# Patient Record
Sex: MALE | Race: WHITE | NOT HISPANIC OR LATINO | URBAN - METROPOLITAN AREA
[De-identification: names, ages, dates, MRNs, and addresses within clinical notes are randomized per-mention and may not be internally consistent; named-entity substitution may affect disease eponyms.]

---

## 2017-01-04 DIAGNOSIS — J01.00 ACUTE MAXILLARY SINUSITIS: ICD-10-CM

## 2017-01-04 DIAGNOSIS — R06.00 DYSPNEA: ICD-10-CM

## 2017-01-04 DIAGNOSIS — R60.9 EDEMA: ICD-10-CM

## 2017-01-04 DIAGNOSIS — J20.9 ACUTE BRONCHITIS: ICD-10-CM

## 2017-01-06 ENCOUNTER — GENERIC CONVERSION - ENCOUNTER (OUTPATIENT)
Dept: OTHER | Facility: OTHER | Age: 82
End: 2017-01-06

## 2017-01-13 ENCOUNTER — GENERIC CONVERSION - ENCOUNTER (OUTPATIENT)
Dept: OTHER | Facility: OTHER | Age: 82
End: 2017-01-13

## 2017-01-20 ENCOUNTER — ALLSCRIPTS OFFICE VISIT (OUTPATIENT)
Dept: OTHER | Facility: OTHER | Age: 82
End: 2017-01-20

## 2017-01-20 ENCOUNTER — GENERIC CONVERSION - ENCOUNTER (OUTPATIENT)
Dept: OTHER | Facility: OTHER | Age: 82
End: 2017-01-20

## 2017-01-27 ENCOUNTER — GENERIC CONVERSION - ENCOUNTER (OUTPATIENT)
Dept: OTHER | Facility: OTHER | Age: 82
End: 2017-01-27

## 2017-02-02 ENCOUNTER — GENERIC CONVERSION - ENCOUNTER (OUTPATIENT)
Dept: OTHER | Facility: OTHER | Age: 82
End: 2017-02-02

## 2017-02-24 ENCOUNTER — GENERIC CONVERSION - ENCOUNTER (OUTPATIENT)
Dept: OTHER | Facility: OTHER | Age: 82
End: 2017-02-24

## 2017-02-28 ENCOUNTER — GENERIC CONVERSION - ENCOUNTER (OUTPATIENT)
Dept: OTHER | Facility: OTHER | Age: 82
End: 2017-02-28

## 2017-03-01 ENCOUNTER — ALLSCRIPTS OFFICE VISIT (OUTPATIENT)
Dept: OTHER | Facility: OTHER | Age: 82
End: 2017-03-01

## 2017-03-03 ENCOUNTER — GENERIC CONVERSION - ENCOUNTER (OUTPATIENT)
Dept: OTHER | Facility: OTHER | Age: 82
End: 2017-03-03

## 2017-03-10 ENCOUNTER — GENERIC CONVERSION - ENCOUNTER (OUTPATIENT)
Dept: OTHER | Facility: OTHER | Age: 82
End: 2017-03-10

## 2017-03-22 ENCOUNTER — GENERIC CONVERSION - ENCOUNTER (OUTPATIENT)
Dept: OTHER | Facility: OTHER | Age: 82
End: 2017-03-22

## 2017-03-22 LAB
25(OH)D3 SERPL-MCNC: NORMAL NG/ML
VITAMIN D, 25 OH, D2 (HISTORICAL): NORMAL
VITAMIN D, 25 OH, D3 (HISTORICAL): NORMAL

## 2017-03-24 ENCOUNTER — GENERIC CONVERSION - ENCOUNTER (OUTPATIENT)
Dept: OTHER | Facility: OTHER | Age: 82
End: 2017-03-24

## 2017-03-24 ENCOUNTER — ALLSCRIPTS OFFICE VISIT (OUTPATIENT)
Dept: OTHER | Facility: OTHER | Age: 82
End: 2017-03-24

## 2017-03-24 DIAGNOSIS — M25.511 PAIN IN RIGHT SHOULDER: ICD-10-CM

## 2017-03-24 DIAGNOSIS — N18.4 CHRONIC KIDNEY DISEASE, STAGE IV (SEVERE) (HCC): ICD-10-CM

## 2017-03-24 DIAGNOSIS — E03.9 HYPOTHYROIDISM: ICD-10-CM

## 2017-03-24 DIAGNOSIS — I50.9 HEART FAILURE (HCC): ICD-10-CM

## 2017-03-31 ENCOUNTER — GENERIC CONVERSION - ENCOUNTER (OUTPATIENT)
Dept: OTHER | Facility: OTHER | Age: 82
End: 2017-03-31

## 2017-04-07 ENCOUNTER — GENERIC CONVERSION - ENCOUNTER (OUTPATIENT)
Dept: OTHER | Facility: OTHER | Age: 82
End: 2017-04-07

## 2017-04-14 ENCOUNTER — GENERIC CONVERSION - ENCOUNTER (OUTPATIENT)
Dept: OTHER | Facility: OTHER | Age: 82
End: 2017-04-14

## 2017-04-17 ENCOUNTER — GENERIC CONVERSION - ENCOUNTER (OUTPATIENT)
Dept: OTHER | Facility: OTHER | Age: 82
End: 2017-04-17

## 2017-04-21 ENCOUNTER — GENERIC CONVERSION - ENCOUNTER (OUTPATIENT)
Dept: OTHER | Facility: OTHER | Age: 82
End: 2017-04-21

## 2017-04-21 ENCOUNTER — ALLSCRIPTS OFFICE VISIT (OUTPATIENT)
Dept: OTHER | Facility: OTHER | Age: 82
End: 2017-04-21

## 2017-04-28 ENCOUNTER — GENERIC CONVERSION - ENCOUNTER (OUTPATIENT)
Dept: OTHER | Facility: OTHER | Age: 82
End: 2017-04-28

## 2017-05-05 ENCOUNTER — GENERIC CONVERSION - ENCOUNTER (OUTPATIENT)
Dept: OTHER | Facility: OTHER | Age: 82
End: 2017-05-05

## 2017-05-12 ENCOUNTER — GENERIC CONVERSION - ENCOUNTER (OUTPATIENT)
Dept: OTHER | Facility: OTHER | Age: 82
End: 2017-05-12

## 2017-05-15 ENCOUNTER — GENERIC CONVERSION - ENCOUNTER (OUTPATIENT)
Dept: OTHER | Facility: OTHER | Age: 82
End: 2017-05-15

## 2017-05-17 ENCOUNTER — GENERIC CONVERSION - ENCOUNTER (OUTPATIENT)
Dept: OTHER | Facility: OTHER | Age: 82
End: 2017-05-17

## 2017-05-19 ENCOUNTER — GENERIC CONVERSION - ENCOUNTER (OUTPATIENT)
Dept: OTHER | Facility: OTHER | Age: 82
End: 2017-05-19

## 2017-06-26 ENCOUNTER — GENERIC CONVERSION - ENCOUNTER (OUTPATIENT)
Dept: OTHER | Facility: OTHER | Age: 82
End: 2017-06-26

## 2017-06-30 ENCOUNTER — GENERIC CONVERSION - ENCOUNTER (OUTPATIENT)
Dept: OTHER | Facility: OTHER | Age: 82
End: 2017-06-30

## 2017-07-03 ENCOUNTER — ALLSCRIPTS OFFICE VISIT (OUTPATIENT)
Dept: OTHER | Facility: OTHER | Age: 82
End: 2017-07-03

## 2018-01-09 NOTE — MISCELLANEOUS
Chief Complaint  Chief Complaint Free Text Note Form: unable to do TO because not done by MA or office staff properly      History of Present Illness  TCM Communication [facility]: The date of admission:, date of discharge: 06/25/2017  He was discharged to home  Symptoms: cough, but no fever, no dizziness, no headache, no fatigue, no shortness of breath, no chest pain, no back pain on left side, no back pain on right side, no arm pain left side, no arm pain on right side, no leg pain on left side, no leg pain on right side, no upper abdominal pain, no middle abdominal pain, no lower abdominal pain, no rash:, no anorexia, no nausea, no vomiting, no loose stools, no constipation, no pain with urinating, no incisional pain, no wound drainage and no swelling  Communication performed and completed by      Active Problems    1  Acute exacerbation of CHF (congestive heart failure) (428 0) (I50 9)   2  A-fib (427 31) (I48 91)   3  Ambulatory dysfunction (719 7) (R26 2)   4  Anemia of renal disease (285 21) (D63 1)   5  Back pain (724 5) (M54 9)   6  CHF (congestive heart failure) (428 0) (I50 9)   7  Chronic kidney disease, stage 4 (severe) (585 4) (N18 4)   8  Chronic obstructive pulmonary disease (496) (J44 9)   9  Cognitive decline (294 9) (R41 89)   10  Depression (311) (F32 9)   11  Dermatitis fungal (111 9) (B36 9)   12  Dermatitis, eczematoid (692 9) (L30 9)   13  Dyspnea (786 09) (R06 00)   14  Edema (782 3) (R60 9)   15  Flu vaccine need (V04 81) (Z23)   16  Gout (274 9) (M10 9)   17  Hyperlipidemia (272 4) (E78 5)   18  Hypertension (401 9) (I10)   19  Hypothyroidism (244 9) (E03 9)   20  Inguinal hernia, left (550 90) (K40 90)   21  Knee pain (719 46) (M25 569)   22  Sacral ulcer (707 8) (L98 429)   23  Shoulder pain, right (719 41) (M25 511)   24  Vitamin D deficiency (268 9) (E55 9)    Surgical History    1  History of Heart Surgery   2  History of Pacemaker Placement   3   History of Pulmonary Valve Repair 4  History of Tonsillectomy   5  History of Transurethral Resection Of Prostate (TURP)    Family History  Mother    1  Family history of Primary malignant neoplasm of colorectal region (154 0) (C19)  Father    2  Family history of Cerebrovascular disease (437 9) (I66 5)    Social History    · Advance directive information unavailable   · Caffeine use (V49 89) (F15 90)   · Denied: History of domestic violence (V15 41)   · Housing Details: House   · Lack of exercise (V69 0) (Z72 3)   ·    · Never a smoker   · Rarely consumes alcohol (V49 89) (Z78 9)   · Retired   · [employer]   · Two children    Current Meds   1  Allopurinol 100 MG Oral Tablet; Take 1 tablet by mouth two  times daily; Therapy: 26LEC6645 to (Evaluate:39Gfl9965)  Requested for: 91Pcq1611; Last   Rx:21Weh9667 Ordered   2  Ammonium Lactate 12 % External Cream; APPLY  AND RUB  IN A THIN FILM TO   AFFECTED AREAS TWICE DAILY  (AM AND PM); Therapy: 07OLG5713 to (Evaluate:04Jun2017)  Requested for: 47ZNH5311; Last   Rx:45Tva6757 Ordered   3  Atorvastatin Calcium 10 MG Oral Tablet; TAKE 1 TABLET DAILY; Therapy: 28HHF5851 to (Evaluate:41Kxi1643)  Requested for: 87YYU8648; Last   Rx:60Uqk5775 Ordered   4  Budesonide 0 5 MG/2ML Inhalation Suspension; USE 1 UNIT DOSE VIA NEBULIZER   DAILY; Therapy: 46MAB2611 to (Evaluate:56Sil9365)  Requested for: 84EHA3423; Last   Rx:10Ewe4782 Ordered   5  Carvedilol 25 MG Oral Tablet; Take 1 tablet by mouth two  times daily; Therapy: 65QTA7430 to (Gloria Escamilla)  Requested for: 35OJA0980; Last   Rx:61Znn6180 Ordered   6  Citalopram Hydrobromide 10 MG Oral Tablet; take 1 tablet by mouth daily; Therapy: 24ABJ2514 to (Evaluate:12Nov2017)  Requested for: 41ALF3417; Last   Rx:34Jza7357 Ordered   7  Digox 125 MCG Oral Tablet; take 1 tablet by mouth every day; Therapy: 47FLK6504 to (Evaluate:07Apr2016); Last Rx:08Mar2016 Ordered   8   Doxercalciferol 0 5 MCG Oral Capsule; TAKE 1 CAPSULE Daily  Requested for:   02Hjr6233; Last Rx:84Zeu9782 Ordered   9  Flector 1 3 % Transdermal Patch; APPLY PATCH TO AFFECTED AREA ONCE DAILY; Therapy: 62IZC3208 to (Evaluate:19Jun2016); Last FRANDY:86QZK2086 Ordered   10  Furosemide 40 MG Oral Tablet; Take 2 tablet by mouth  twice a day; Therapy: 57HVC7642 to (Evaluate:29Nov2017)  Requested for: 10TGP4963; Last    Rx:02Jun2017 Ordered   11  Ipratropium-Albuterol 0 5-2 5 (3) MG/3ML Inhalation Solution; 1 unit dose qid prn; Therapy: 57XBP8617 to (Evaluate:09Fcx5807)  Requested for: 09KDK5123; Last    Rx:84Vny0908 Ordered   12  Levothyroxine Sodium 100 MCG Oral Tablet; TAKE 1 TABLET DAILY AS DIRECTED; Therapy: 44EER6133 to (Evaluate:96Jha5242)  Requested for: 52SYR2621; Last    Rx:17May2017 Ordered   13  Lisinopril 2 5 MG Oral Tablet; take 1 tablet by mouth daily; Therapy: 32PVK7843 to (Evaluate:13May2017)  Requested for: 45CRX0269; Last    Rx:18May2016 Ordered   14  Mupirocin Calcium 2 % External Cream; APPLY AND GENTLY MASSAGE INTO    AFFECTED AREA(S) TWICE DAILY; Therapy: 79MDP3219 to (Evaluate:18Jan2016); Last YP:20XZE2988 Ordered   15  Mupirocin Calcium 2 % External Cream; APPLY AND GENTLY MASSAGE INTO    AFFECTED AREA(S) TWICE DAILY; Therapy: 29Apr2015 to (Brian Cortes)  Requested for: 29Apr2015; Last    Rx:29Apr2015 Ordered   16  Promethazine-Codeine 6 25-10 MG/5ML Oral Syrup; take 1 teaspoonful every 6 hours as    needed; Therapy: 64URV6635 to (Evaluate:90Xam4851)  Requested for: 04Ith4554; Last    Rx:05Aug2016 Ordered   17  Santyl 250 UNIT/GM External Ointment; APPLY TO AFFECTED AREA(S) ONCE DAILY AS    DIRECTED; Therapy: 73VOW7701 to (Evaluate:16Apr2016)  Requested for: 35XGS3368; Last    Rx:17Mar2016 Ordered   18  TraMADol HCl - 50 MG Oral Tablet; TAKE 1/2-1 TABLET BY MOUTH 3 TIMES A DAY AS    NEEDED FOR PAIN;    Therapy: 84WFN0187 to (Evaluate:28Fct2877)  Requested for: 70YAB7746; Last    Rx:11Mar2017 Ordered   19   Vitamin D (Ergocalciferol) 38826 UNIT Oral Capsule; TAKE 1 CAPSULE BY MOUTH    TWICE A WEEK; Therapy: 93CBJ3690 to (Evaluate:34Eks6036)  Requested for: 73HXK8927; Last    Rx:30Mar2017 Ordered   20  Warfarin Sodium 5 MG Oral Tablet; take 1 tablet by mouth  daily as directed; Therapy: 24Gwh2911 to (Evaluate:25Coq3933)  Requested for: 97Mrg8858; Last    Rx:22Hpg0143 Ordered    Allergies    1  morphine    Signatures   Electronically signed by :  Casey Goodman MD; Jul  3 2017  2:15PM EST                       (Author)

## 2018-01-09 NOTE — PROGRESS NOTES
Assessment    1  Hematoma of lower extremity, right, sequela (906 3) (S80 11XS)   2  Herpes zoster without complication (926 0) (U35 9)    Plan  Herpes zoster without complication    · ValACYclovir HCl - 500 MG Oral Tablet (Valtrex); 1 po tid    Chief Complaint  Chief Complaint Free Text Note Form: f/u after hematoma started leaking, possible shingles      History of Present Illness  Skin Wound:   Dreic Snider presents with complaints of mild right leg skin wound, described as dull  The symptoms resulted from a fall  Symptoms are improving  Previous Evaluation: hematoma "burst" on its own Monday  Herpes Zoster (Brief): The patient is being seen for an initial evaluation of herpes zoster  Symptoms:  dermatomal pain, skin tingling, localized itching and skin redness, but no rash and no vesicular lesion(s)  Associated symptoms:  just doesn' t feel well, but no fever and no chills  The patient is not currently being treated for this problem  The patient has had one previous episode of herpes zoster  Pertinent medical history:  chicken pox  Review of Systems  Complete-Male:   Constitutional: feeling poorly, but no fever, no recent weight gain, no chills and no recent weight loss  Cardiovascular: no chest pain  Respiratory: no cough  Integumentary: a rash, itching and skin wound  Neurological: no headache  Active Problems    1  Acute bronchitis (466 0) (J20 9)   2  A-fib (427 31) (I48 91)   3  Cataract (366 9) (H26 9)   4  Cellulitis (682 9) (L03 90)   5  CHF (congestive heart failure) (428 0) (I50 9)   6  Chronic obstructive pulmonary disease (496) (J44 9)   7  CKD (chronic kidney disease), stage III (585 3) (N18 3)   8  Degenerative disc disease (722 6)   9  Depression (311) (F32 9)   10  Diverticulitis of large intestine without perforation or abscess without bleeding (562 11)    (K57 32)   11  Dyspnea (786 09) (R06 00)   12  Edema (782 3) (R60 9)   13  Flu vaccine need (V04 81) (Z23)   14  Gout (274 9) (M10 9)   15  Hematoma of lower extremity, right, sequela (906 3) (S80 11XS)   16  Hyperlipidemia (272 4) (E78 5)   17  Hypertension (401 9) (I10)   18  Hypothyroidism (244 9) (E03 9)   19  Nausea (787 02) (R11 0)   20  Otitis media (382 9) (H66 90)    Surgical History    1  History of Heart Surgery   2  History of Pacemaker Placement   3  History of Pulmonary Valve Repair   4  History of Tonsillectomy   5  History of Transurethral Resection Of Prostate (TURP)  Surgical History Reviewed: The surgical history was reviewed and updated today  Family History    1  Family history of Primary malignant neoplasm of colorectal region (154 0) (C19)    2  Family history of Cerebrovascular disease (437 9) (I66 5)  Family History Reviewed: The family history was reviewed and updated today  Social History    · Caffeine use (V49 89) (F15 90)   · Denied: History of domestic violence (V15 41)   · Lack of exercise (V69 0) (Z72 3)   ·    · Never a smoker   · Rarely consumes alcohol (V49 89) (Z78 9)   · Two children  Social History Reviewed: The social history was reviewed and updated today  Current Meds   1  Allopurinol 100 MG Oral Tablet; Take 1 tablet by mouth two  times daily; Therapy: 63NFR5580 to (Evaluate:19Ppx5793)  Requested for: 63XOR7394; Last   Rx:12Jan2015 Ordered   2  Atorvastatin Calcium 10 MG Oral Tablet (Lipitor); TAKE 1 TABLET DAILY; Therapy: 46AJY5080 to (Evaluate:56Cfq5467)  Requested for: 14DFG0249; Last   Rx:02Jul2015 Ordered   3  Budesonide 0 5 MG/2ML Inhalation Suspension; USE 1 UNIT DOSE VIA NEBULIZER   DAILY; Therapy: 44XQL4534 to (Evaluate:36Pjt3177)  Requested for: 59SFG2606; Last   Rx:90Kaa7910 Ordered   4  Carvedilol 25 MG Oral Tablet; 1po bid; Therapy: 35MCL5267 to (Evaluate:49Xpp4055)  Requested for: 93HLG6765; Last   Rx:15Oct2015 Ordered   5   Cephalexin 500 MG Oral Capsule; TAKE 1 CAPSULE 3 TIMES DAILY UNTIL GONE;   Therapy: 29NBP8043 to (Evaluate:18Jan2016); Last GT:01WZD9757 Ordered   6  Citalopram Hydrobromide 10 MG Oral Tablet (CeleXA); take 1 tablet by mouth daily; Therapy: 56LJH0584 to (Evaluate:38Uhe0610)  Requested for: 39KCZ2428; Last   Rx:06Jan2016 Ordered   7  Diltiazem HCl - 120 MG Oral Tablet; Take 1 tablet twice daily; Therapy: 58UHP0250 to (Lamont Borgesather)  Requested for: 37PIN2657; Last   Rx:26Jun2015 Ordered   8  Doxercalciferol 0 5 MCG Oral Capsule (Hectorol); TAKE 1 CAPSULE Daily  Requested   for: 26Tzc4290; Last Rx:96Vsg7077 Ordered   9  Doxycycline Hyclate 100 MG Oral Capsule; TAKE 1 CAPSULE TWICE DAILY UNTIL   GONE;   Therapy: 09LZP4313 to (Evaluate:26Dec2015)  Requested for: 70CDR9533; Last   Rx:33Bep3335 Ordered   10  Doxycycline Hyclate 100 MG Oral Capsule; TAKE 1 CAPSULE TWICE DAILY UNTIL    GONE;    Therapy: 18OQD8248 to (Evaluate:07Jan2016)  Requested for: 05Wch4028; Last    Rx:28Dec2015 Ordered   11  Furosemide 40 MG Oral Tablet; TAKE 1 TABLET TWICE DAILY; Therapy: 42ZCX8743 to (Evaluate:20Jun2016)  Requested for: 75BDB5846; Last    Rx:26Jun2015 Ordered   12  Ipratropium-Albuterol 0 5-2 5 (3) MG/3ML Inhalation Solution (DuoNeb); 1 unit dose qid    prn; Therapy: 12HYX2082 to (Evaluate:46Jxu0079)  Requested for: 45GRN3364; Last    Rx:24Gkw5497 Ordered   13  Levofloxacin 500 MG Oral Tablet (Levaquin); TAKE 1 TABLET DAILY; Therapy: 56YOF2863 to (Evaluate:81Cgr7959)  Requested for: 11XVA9961; Last    Rx:01Dec2015 Ordered   14  Levothyroxine Sodium 88 MCG Oral Tablet; Take 1 tablet daily + extra 1/2 tablelet (44    mcg) once weekly; Therapy: 68HYF2663 to (Evaluate:28Oct2016)  Requested for: 73ETU0756; Last    Rx:03Nov2015 Ordered   15  Lisinopril 2 5 MG Oral Tablet; take 1 tablet by mouth daily; Therapy: 94NWR1706 to (Evaluate:43Qnb6417)  Requested for: 76NWH5773; Last    Rx:00Dfh1388 Ordered   16  MetroNIDAZOLE 500 MG Oral Tablet; Take 1 three times daily for 10 days;     Therapy: 54ZPR0558 to (Evaluate:20Sep2015); Last Rx:83Ked9103 Ordered   17  Mupirocin Calcium 2 % External Cream (Bactroban); APPLY AND GENTLY MASSAGE    INTO AFFECTED AREA(S) TWICE DAILY; Therapy: 36UVG1168 to (Evaluate:18Jan2016); Last FB:80QBZ9120 Ordered   18  Mupirocin Calcium 2 % External Cream (Bactroban); APPLY AND GENTLY MASSAGE    INTO AFFECTED AREA(S) TWICE DAILY; Therapy: 66Hpi7099 to (Jovanny Ennis)  Requested for: 29Apr2015; Last    Rx:22Ftl0064 Ordered   19  Ondansetron HCl - 4 MG Oral Tablet; 1 PO BID PRN NAUSEA; Therapy: 64WGZ0305 to (Mir Aldan)  Requested for: 77YHJ8058; Last    Rx:18Fcx6746 Ordered   20  PredniSONE 10 MG Oral Tablet; 4/d for  3days, 3/d for 3 days, 2/d for 3 days, 1/d for 3    days; Therapy: 88BAU4788 to (Evaluate:09Jan2016)  Requested for: 0676 543 19 15; Last    Rx:21Sjb8074 Ordered   21  PredniSONE 20 MG Oral Tablet; 1 PO BID; Therapy: 99NPP0875 to (Evaluate:39Aap6978)  Requested for: 92Fns8766; Last    Rx:64Jqc6385 Ordered   22  Promethazine-Codeine 6 25-10 MG/5ML Oral Syrup; take 1 teaspoonful every 6 hours as    needed; Therapy: 88Vfu6454 to (Evaluate:45Mvp2186)  Requested for: 04GIP9863; Last    Rx:44Yyt5238 Ordered   23  TraMADol HCl - 50 MG Oral Tablet; 1/2-1 po tid prn pain; Therapy: 55MRI7969 to (Evaluate:03Mar2015); Last Rx:02Jan2015 Ordered   24  Warfarin Sodium 5 MG Oral Tablet; take 1 tablet by mouth  daily as directed; Therapy: 47Faj6995 to (Evaluate:55Qsd6006)  Requested for: 38Dma5923; Last    Rx:81Szw1158 Ordered  Medication List Reviewed: The medication list was reviewed and updated today  Allergies    1  morphine    Vitals  Signs [Data Includes: Current Encounter]   Recorded: 77XDV5770 12:42PM   Heart Rate: 74  Systolic: 926  Diastolic: 77  Height Unobtainable: Yes  Weight Unobtainable: Yes    Physical Exam    Constitutional   General appearance: Abnormal   chronically ill, uncomfortable, overweight and appears tired     Pulmonary Auscultation of lungs: Clear to auscultation, equal breath sounds bilaterally, no wheezes, no rales, no rhonci  Cardiovascular   Auscultation of heart: Abnormal   The rhythm was irregularly irregular  Examination of extremities for edema and/or varicosities: Abnormal   right ankle 2+ pitting edema, left ankle 1+ pitting edema, right pretibial 2+ pitting edema and left pretibial 1+ pretibial pitting edema  Skin   Examination of the skin for lesions: Abnormal   Multiple, red vesicle(s) in a L T 10 dermatomal distribution  A 3 cm ecchymosis was noted  on the right shin  Discussion/Summary  Discussion Summary:   Will contact [facility] regarding wound, see how pt does on meds  Signatures   Electronically signed by :  Hudson Jonas MD; Jan 28 2016 12:50PM EST                       (Author)

## 2018-01-10 NOTE — RESULT NOTES
Verified Results  Coumadin Flow Sheet [id number] 01:42PM Gretchen Gomes     Test Name Result Flag Reference   INR 1 2     Current Dose ALT 2 5/5MG QD

## 2018-01-10 NOTE — RESULT NOTES
Verified Results  Coumadin Flow Sheet [id number] 09:57AM Doug Esqueda     Test Name Result Flag Reference   INR 2 9     Current Dose 6MG QD

## 2018-01-10 NOTE — RESULT NOTES
Verified Results  Coumadin Flow Sheet [id number] 08:04AM Catherine Garcia     Test Name Result Flag Reference   INR 1 8

## 2018-01-10 NOTE — RESULT NOTES
Verified Results  Coumadin Flow Sheet [id number] 08:28AM Myers Hunger     Test Name Result Flag Reference   INR 1 6     Current Dose      5mg qd after holding for 3

## 2018-01-10 NOTE — RESULT NOTES
Verified Results  Coumadin Flow Sheet [id number] 08:47AM Any Cones     Test Name Result Flag Reference   INR 1 4     Current Dose ALT 2 5 & 5 MG

## 2018-01-10 NOTE — RESULT NOTES
Verified Results  Coumadin Flow Sheet [id number] 09:20AM Doug Esqueda     Test Name Result Flag Reference   INR 2 7     Current Dose ALT 2 5/5MG QD

## 2018-01-11 NOTE — RESULT NOTES
Verified Results  Coumadin Flow Sheet [id number] 09:20AM Debrah Ahumada     Test Name Result Flag Reference   INR 1 6

## 2018-01-11 NOTE — RESULT NOTES
Verified Results  Coumadin Flow Sheet [id number] 08:37AM Hettie December     Test Name Result Flag Reference   INR 1 5     Current Dose ALT 7 5MG/5MG

## 2018-01-11 NOTE — RESULT NOTES
Verified Results  Coumadin Flow Sheet [id number] 09:14AM Evert White     Test Name Result Flag Reference   INR 1 8

## 2018-01-11 NOTE — RESULT NOTES
Verified Results  Coumadin Flow Sheet [id number] 08:30AM Gillian Fenton     Test Name Result Flag Reference   INR 1 3     Current Dose ALT 2 5/5MGQD

## 2018-01-11 NOTE — RESULT NOTES
Verified Results  Coumadin Flow Sheet [id number] 08:48AM Leslie Kulkarni     Test Name Result Flag Reference   INR 1 6     Current Dose 5mg qd

## 2018-01-11 NOTE — RESULT NOTES
Verified Results  Coumadin Flow Sheet [id number] 08:30AM Aviva Presser     Test Name Result Flag Reference   INR 1 4

## 2018-01-11 NOTE — RESULT NOTES
Verified Results  Coumadin Flow Sheet [id number] 08:30AM Raul Medley     Test Name Result Flag Reference   INR 3 6     Current Dose alt 2 5/5mg

## 2018-01-11 NOTE — RESULT NOTES
Verified Results  Coumadin Flow Sheet [id number] 10:59AM Xiang Chau     Test Name Result Flag Reference   INR 3 3     Current Dose alt 2 5/5mg

## 2018-01-12 NOTE — RESULT NOTES
Verified Results  Coumadin Flow Sheet [id number] 11:15AM Alessandro De Leon     Test Name Result Flag Reference   INR 1 3

## 2018-01-12 NOTE — RESULT NOTES
Verified Results  Coumadin Flow Sheet [id number] 08:36AM Leslie Kulkarni     Test Name Result Flag Reference   INR 4 7     Current Dose      PER PT WAS TOLD BY YOU TO TAKE WHOLE TAB QD

## 2018-01-12 NOTE — RESULT NOTES
Verified Results  Coumadin Flow Sheet [id number] 08:10AM Princess Schilling     Test Name Result Flag Reference   INR 2 5     Current Dose ALT 7 5/5MG

## 2018-01-12 NOTE — RESULT NOTES
Verified Results  Coumadin Flow Sheet [id number] 11:20AM Christ Bey     Test Name Result Flag Reference   INR 1 9

## 2018-01-12 NOTE — RESULT NOTES
Verified Results  Coumadin Flow Sheet [id number] 08:48AM João Zuniga     Test Name Result Flag Reference   INR 1 2     Current Dose alt 5mg qd/7 5mg qd

## 2018-01-12 NOTE — RESULT NOTES
Verified Results  Coumadin Flow Sheet [id number] 11:18AM Walton Bolaños     Test Name Result Flag Reference   INR 2 7     Current Dose 7 5mg alt 5mg

## 2018-01-12 NOTE — RESULT NOTES
Verified Results  Coumadin Flow Sheet [id number] 08:45AM [facility]     Test Name Result Flag Reference   INR 1 4

## 2018-01-12 NOTE — RESULT NOTES
Verified Results  Coumadin Flow Sheet [id number] 08:29AM Catalina Gotti     Test Name Result Flag Reference   INR 2 5     Current Dose 6MG QD

## 2018-01-12 NOTE — RESULT NOTES
Verified Results  Coumadin Flow Sheet [id number] 09:22AM Francia Moran     Test Name Result Flag Reference   INR 1 3     Current Dose ALT 5MG /7 5 MG QD

## 2018-01-12 NOTE — RESULT NOTES
Verified Results  Coumadin Flow Sheet [id number] 08:08AM Geraldene Ego     Test Name Result Flag Reference   INR 1 4     Current Dose 5MG QD

## 2018-01-13 VITALS — DIASTOLIC BLOOD PRESSURE: 72 MMHG | SYSTOLIC BLOOD PRESSURE: 110 MMHG | HEART RATE: 76 BPM

## 2018-01-13 VITALS
HEIGHT: 61 IN | BODY MASS INDEX: 30.78 KG/M2 | HEART RATE: 94 BPM | WEIGHT: 163 LBS | DIASTOLIC BLOOD PRESSURE: 74 MMHG | SYSTOLIC BLOOD PRESSURE: 110 MMHG

## 2018-01-13 VITALS
SYSTOLIC BLOOD PRESSURE: 110 MMHG | HEART RATE: 80 BPM | WEIGHT: 163 LBS | DIASTOLIC BLOOD PRESSURE: 70 MMHG | BODY MASS INDEX: 30.78 KG/M2 | HEIGHT: 61 IN

## 2018-01-13 VITALS — DIASTOLIC BLOOD PRESSURE: 70 MMHG | HEART RATE: 80 BPM | SYSTOLIC BLOOD PRESSURE: 110 MMHG

## 2018-01-13 VITALS
HEIGHT: 61 IN | WEIGHT: 165 LBS | OXYGEN SATURATION: 96 % | SYSTOLIC BLOOD PRESSURE: 110 MMHG | BODY MASS INDEX: 31.15 KG/M2 | HEART RATE: 72 BPM | DIASTOLIC BLOOD PRESSURE: 80 MMHG

## 2018-01-13 NOTE — RESULT NOTES
Verified Results  Coumadin Flow Sheet [id number] 08:27AM Roxianne Floor     Test Name Result Flag Reference   INR 1 4     Current Dose ALT 5MG/2 5MG

## 2018-01-13 NOTE — RESULT NOTES
Verified Results  Coumadin Flow Sheet [id number] 08:18AM Heron Coates     Test Name Result Flag Reference   INR 1 4     Current Dose TAKE 7 5mg/5mg

## 2018-01-13 NOTE — RESULT NOTES
Verified Results  Coumadin Flow Sheet [id number] 08:13AM Mark Erazo     Test Name Result Flag Reference   INR 2 4

## 2018-01-13 NOTE — RESULT NOTES
Verified Results  Coumadin Flow Sheet [id number] 08:37AM Eliot Ball     Test Name Result Flag Reference   INR 2 4     Current Dose ALT 2 5 & 5 MG

## 2018-01-13 NOTE — RESULT NOTES
Verified Results  Coumadin Flow Sheet [id number] 09:51AM Catalina Gotti     Test Name Result Flag Reference   INR 2 6     Current Dose ALT 7 5/5MG

## 2018-01-13 NOTE — RESULT NOTES
Verified Results  Coumadin Flow Sheet [id number] 02:17PM Rory Mckay     Test Name Result Flag Reference   INR 3 3     Current Dose alt 7 5mg/5mg

## 2018-01-14 NOTE — RESULT NOTES
Verified Results  Coumadin Flow Sheet [id number] 08:57AM Sarpy Bolaños     Test Name Result Flag Reference   INR 2 6     Current Dose      ALT WHOLE TAB AND 1/2 TAB

## 2018-01-14 NOTE — RESULT NOTES
Verified Results  Coumadin Flow Sheet [id number] 08:52AM Gillian Fenton     Test Name Result Flag Reference   INR 1 3     Current Dose ALT 7 5MG AND 5MG

## 2018-01-14 NOTE — RESULT NOTES
Verified Results  Coumadin Flow Sheet [id number] 08:45AM Mark Erazo     Test Name Result Flag Reference   INR 2 1     Current Dose alt 2 5/5mg qd

## 2018-01-14 NOTE — RESULT NOTES
Verified Results  Coumadin Flow Sheet [id number] 10:23AM Hettie December     Test Name Result Flag Reference   INR 1 3     Current Dose 5MG/ 7 5 MG QD

## 2018-01-14 NOTE — PROGRESS NOTES
Assessment    1  Hematoma of lower extremity, right, sequela (906 3) (S80 11XS)    Plan  Hematoma of lower extremity, right, sequela    · Follow-up visit in 3 weeks Evaluation and Treatment  Follow-up  Status: Hold For -  Scheduling  Requested for: 21MSD5902    Chief Complaint  Chief Complaint Free Text Note Form: worried hematoma looks worse-off coumadin due to subdural, still coughing up mucus      History of Present Illness  Skin Wound:   Ana Foley presents with complaints of moderately severe right leg skin wound, described as dull  The symptoms resulted from a fall  The injury occurred at home  Symptoms are unchanged  Previous Evaluation: In hospital for subdural and testing of leg wound was negative, no ortho eval       Review of Systems  Complete-Male:   Constitutional: feeling poorly  Cardiovascular: lower extremity edema and feels edema is worse  Respiratory: cough  Integumentary: skin wound  Neurological: headache  Active Problems    1  Acute bronchitis (466 0) (J20 9)   2  A-fib (427 31) (I48 91)   3  Cataract (366 9) (H26 9)   4  Cellulitis (682 9) (L03 90)   5  CHF (congestive heart failure) (428 0) (I50 9)   6  Chronic obstructive pulmonary disease (496) (J44 9)   7  CKD (chronic kidney disease), stage III (585 3) (N18 3)   8  Degenerative disc disease (722 6)   9  Depression (311) (F32 9)   10  Diverticulitis of large intestine without perforation or abscess without bleeding (562 11)    (K57 32)   11  Dyspnea (786 09) (R06 00)   12  Edema (782 3) (R60 9)   13  Flu vaccine need (V04 81) (Z23)   14  Gout (274 9) (M10 9)   15  Hematoma of lower extremity, right, sequela (906 3) (S80 11XS)   16  Hyperlipidemia (272 4) (E78 5)   17  Hypertension (401 9) (I10)   18  Hypothyroidism (244 9) (E03 9)   19  Nausea (787 02) (R11 0)   20  Otitis media (382 9) (H66 90)    Surgical History    1  History of Heart Surgery   2  History of Pacemaker Placement   3  History of Pulmonary Valve Repair   4  History of Tonsillectomy   5  History of Transurethral Resection Of Prostate (TURP)  Surgical History Reviewed: The surgical history was reviewed and updated today  Family History    1  Family history of Primary malignant neoplasm of colorectal region (154 0) (C19)    2  Family history of Cerebrovascular disease (437 9) (I66 5)  Family History Reviewed: The family history was reviewed and updated today  Social History    · Caffeine use (V49 89) (F15 90)   · Denied: History of domestic violence (V15 41)   · Lack of exercise (V69 0) (Z72 3)   ·    · Never a smoker   · Rarely consumes alcohol (V49 89) (Z78 9)   · Two children  Social History Reviewed: The social history was reviewed and updated today  Current Meds   1  Allopurinol 100 MG Oral Tablet; Take 1 tablet by mouth two  times daily; Therapy: 53HGF2502 to (Evaluate:84Vfn5145)  Requested for: 60PYA1282; Last   Rx:12Jan2015 Ordered   2  Atorvastatin Calcium 10 MG Oral Tablet (Lipitor); TAKE 1 TABLET DAILY; Therapy: 60ILB2745 to (Evaluate:15Phx7863)  Requested for: 46VBT3622; Last   Rx:02Jul2015 Ordered   3  Budesonide 0 5 MG/2ML Inhalation Suspension; USE 1 UNIT DOSE VIA NEBULIZER   DAILY; Therapy: 25CJQ3888 to (Evaluate:84Zpy7941)  Requested for: 93YIF7382; Last   Rx:92Jxk9285 Ordered   4  Carvedilol 25 MG Oral Tablet; 1po bid; Therapy: 19OZC2891 to (Evaluate:71Ovc8974)  Requested for: 10LGM6948; Last   Rx:15Oct2015 Ordered   5  Cephalexin 500 MG Oral Capsule; TAKE 1 CAPSULE 3 TIMES DAILY UNTIL GONE;   Therapy: 55MPZ7410 to (Evaluate:18Jan2016); Last JY:20XTO4778 Ordered   6  Ciprofloxacin HCl - 250 MG Oral Tablet; TAKE 1 TABLET EVERY 12 HOURS DAILY; Therapy: 86Oae4976 to (Chicho Ram)  Requested for: 32FSY1641; Last   Rx:13Jan2016 Ordered   7  Citalopram Hydrobromide 10 MG Oral Tablet (CeleXA); take 1 tablet by mouth daily;    Therapy: 13TNX1138 to (Evaluate:75Anj9880)  Requested for: 17DNX8999; Last   QY:39IBM0884 Ordered   8  Diltiazem HCl - 120 MG Oral Tablet; Take 1 tablet twice daily; Therapy: 29RNO3266 to (Bay Goodson)  Requested for: 50ZCT7830; Last   XQ:06CAE0729 Ordered   9  Doxercalciferol 0 5 MCG Oral Capsule (Hectorol); TAKE 1 CAPSULE Daily  Requested   for: 28Pxe7938; Last Rx:93Osl3450 Ordered   10  Doxycycline Hyclate 100 MG Oral Capsule; TAKE 1 CAPSULE TWICE DAILY UNTIL    GONE;    Therapy: 04CWH0938 to (Evaluate:26Ord2474)  Requested for: 98MKT8090; Last    Rx:92Ymm7446 Ordered   11  Doxycycline Hyclate 100 MG Oral Capsule; TAKE 1 CAPSULE TWICE DAILY UNTIL    GONE;    Therapy: 14WPH6501 to (Evaluate:07Jan2016)  Requested for: 90Yxg6245; Last    Rx:28Dec2015 Ordered   12  Furosemide 40 MG Oral Tablet; TAKE 1 TABLET TWICE DAILY; Therapy: 26UTG0120 to (Evaluate:20Jun2016)  Requested for: 74NMH9085; Last    Rx:26Jun2015 Ordered   13  Ipratropium-Albuterol 0 5-2 5 (3) MG/3ML Inhalation Solution (DuoNeb); 1 unit dose qid    prn; Therapy: 68QKK8158 to (Evaluate:55Fcs3255)  Requested for: 74HNE9264; Last    Rx:65Sli8882 Ordered   14  Levofloxacin 500 MG Oral Tablet (Levaquin); TAKE 1 TABLET DAILY; Therapy: 15XUR0215 to (Evaluate:06Gza5240)  Requested for: 30TIT7603; Last    Rx:83Wmi1841 Ordered   15  Levothyroxine Sodium 88 MCG Oral Tablet; Take 1 tablet daily + extra 1/2 tablelet (44    mcg) once weekly; Therapy: 11NCK0824 to (Evaluate:28Oct2016)  Requested for: 33BUK8290; Last    Rx:03Nov2015 Ordered   16  Lisinopril 2 5 MG Oral Tablet; take 1 tablet by mouth daily; Therapy: 87IHO3311 to (Evaluate:73Wak0368)  Requested for: 87PXX8151; Last    Rx:05Cau7423 Ordered   17  MetroNIDAZOLE 500 MG Oral Tablet; Take 1 three times daily for 10 days; Therapy: 42Pkf5765 to (Evaluate:20Sep2015); Last Rx:89Ntf8293 Ordered   18  Mupirocin Calcium 2 % External Cream (Bactroban); APPLY AND GENTLY MASSAGE    INTO AFFECTED AREA(S) TWICE DAILY; Therapy: 88IKX3031 to (Evaluate:18Jan2016);  Last QN:33RVM3750 Ordered   19  Mupirocin Calcium 2 % External Cream (Bactroban); APPLY AND GENTLY MASSAGE    INTO AFFECTED AREA(S) TWICE DAILY; Therapy: 29Apr2015 to (Iesha Garcia)  Requested for: 29Apr2015; Last    Rx:38Ljg4843 Ordered   20  Ondansetron HCl - 4 MG Oral Tablet; 1 PO BID PRN NAUSEA; Therapy: 77TBZ5588 to (Marline Tanner)  Requested for: 54SJS5879; Last    Rx:13Ygn2257 Ordered   21  PredniSONE 10 MG Oral Tablet; 4/d for  3days, 3/d for 3 days, 2/d for 3 days, 1/d for 3    days; Therapy: 53ZWJ1731 to (Evaluate:09Jan2016)  Requested for: 0676 543 19 15; Last    Rx:36Mur8473 Ordered   22  PredniSONE 20 MG Oral Tablet; 1 PO BID; Therapy: 81HEN9960 to (Evaluate:32Aza4136)  Requested for: 68Hvb4907; Last    Rx:04Zvr7709 Ordered   23  Promethazine-Codeine 6 25-10 MG/5ML Oral Syrup; take 1 teaspoonful every 6 hours as    needed; Therapy: 70Vdy8350 to (Evaluate:42Rzm6002)  Requested for: 29YXC1302; Last    Rx:42Ftn3179 Ordered   24  TraMADol HCl - 50 MG Oral Tablet; 1/2-1 po tid prn pain; Therapy: 01WHJ3467 to (Evaluate:03Mar2015); Last Rx:02Jan2015 Ordered   25  Warfarin Sodium 5 MG Oral Tablet; take 1 tablet by mouth  daily as directed; Therapy: 72Eij6511 to (Evaluate:13Zrq2763)  Requested for: 59Dqn3719; Last    Rx:27Vck9753 Ordered  Medication List Reviewed: The medication list was reviewed and updated today  Allergies    1  morphine    Vitals  Signs [Data Includes: Current Encounter]   Recorded: 16KAA4717 08:29PM   Heart Rate: 80  Systolic: 464  Diastolic: 80  Height: 5 ft 2 in  Weight: 174 lb   BMI Calculated: 31 83  BSA Calculated: 1 80    Physical Exam    Constitutional   General appearance: Abnormal   chronically ill, uncomfortable, obese and appears tired  Pulmonary   Auscultation of lungs: Abnormal   wheezing over both bases  Cardiovascular   Auscultation of heart: Abnormal   The heart rate was normal  The rhythm was irregularly irregular     Examination of extremities for edema and/or varicosities: Abnormal   (no Selam's , no palpable cords) right ankle 2+ pitting edema, left ankle 1+ pitting edema, right pretibial 2+ pitting edema and left pretibial 1+ pretibial pitting edema  Skin   Examination of the skin for lesions: Abnormal   A medium ecchymosis was noted  on the right shin described as  (wound itself looks a little better but hard to tell due to surrounding erythema) moist, indurated and edema, but healing well, presenting without active bleeding, presenting without drainage and normal temperature  Discussion/Summary  Discussion Summary:   Elevate, ice, continue antibiotics, take extra water pill tomorrow and next day  had discussion with pt/wife about risks of coumadin and discussing with cardiology what best treatment for a fib would be, also broached idea about assisted living-may want to look into apartments at [facility]  Signatures   Electronically signed by :  Fred Campo MD; Jan 14 2016  8:36PM EST                       (Author)

## 2018-01-15 NOTE — RESULT NOTES
Verified Results  Coumadin Flow Sheet [id number] 08:45AM Catalina Gotti     Test Name Result Flag Reference   INR 1 8

## 2018-01-15 NOTE — MISCELLANEOUS
Assessment    1  CHF (congestive heart failure) (428 0) (I50 9)   2  Hematoma of lower extremity, right, sequela (906 3) (S80 11XS)    Plan  CHF (congestive heart failure), Hematoma of lower extremity, right, sequela    · Follow-up visit in 1 month Evaluation and Treatment  Follow-up  Status: Hold For -  Scheduling  Requested for: 92Zpu8403   Ordered; For: CHF (congestive heart failure), Hematoma of lower extremity, right, sequela; Ordered By: Lucia King Performed:  Due: 32XKO0166    Discussion/Summary  Discussion Summary:   Continute wound therapy edema better,wound improving,breathing better  Chief Complaint  Chief Complaint Free Text Note Form: f/u hospitalization for CHF/wound infection,saw kidney doc yesterday and they increased diuretic      History of Present Illness  TCM Communication [facility]: The patient is being contacted for follow-up after hospitalization  Hospital records were reviewed  He was hospitalized at [place]  The date of admission: 02/12/2016, date of discharge:  Diagnosis: CHF  He was discharged to home  Medications reviewed and updated today  He scheduled a follow up appointment  Symptoms: chest pain, wound drainage, swelling and swelling location legs, but no fever, no dizziness, no headache and no cough  Post hospital issues: reduced activity, awareness of aftercare interventions and still not getting around well due to leg,visiting nurses wrapping  Communication performed and completed by hattie/zakiya      Review of Systems  Complete-Male:   Constitutional: feeling tired, but no fever, not feeling poorly and no chills  Cardiovascular: lower extremity edema, but no chest pain and no palpitations  Respiratory: shortness of breath during exertion, but no cough  Gastrointestinal: no nausea  Musculoskeletal: limb pain and leg pain  Neurological: no numbness  Active Problems    1  Acute bronchitis (466 0) (J20 9)   2   A-fib (427 31) (I48 91)   3  Cataract (366  9) (H26 9)   4  Cellulitis (682 9) (L03 90)   5  CHF (congestive heart failure) (428 0) (I50 9)   6  Chronic obstructive pulmonary disease (496) (J44 9)   7  CKD (chronic kidney disease), stage III (585 3) (N18 3)   8  Degenerative disc disease (722 6)   9  Depression (311) (F32 9)   10  Diverticulitis of large intestine without perforation or abscess without bleeding (562 11)    (K57 32)   11  Dyspnea (786 09) (R06 00)   12  Edema (782 3) (R60 9)   13  Flu vaccine need (V04 81) (Z23)   14  Gout (274 9) (M10 9)   15  Hematoma of lower extremity, right, sequela (906 3) (S80 11XS)   16  Herpes zoster without complication (937 4) (U18 9)   17  Hyperlipidemia (272 4) (E78 5)   18  Hypertension (401 9) (I10)   19  Hypothyroidism (244 9) (E03 9)   20  Nausea (787 02) (R11 0)   21  Otitis media (382 9) (H66 90)    Surgical History    1  History of Heart Surgery   2  History of Pacemaker Placement   3  History of Pulmonary Valve Repair   4  History of Tonsillectomy   5  History of Transurethral Resection Of Prostate (TURP)  Surgical History Reviewed: The surgical history was reviewed and updated today  Family History    1  Family history of Primary malignant neoplasm of colorectal region (154 0) (C19)    2  Family history of Cerebrovascular disease (437 9) (I66 5)  Family History Reviewed: The family history was reviewed and updated today  Social History    · Caffeine use (V49 89) (F15 90)   · Denied: History of domestic violence (V15 41)   · Lack of exercise (V69 0) (Z72 3)   ·    · Never a smoker   · Rarely consumes alcohol (V49 89) (Z78 9)   · Two children  Social History Reviewed: The social history was reviewed and updated today  Current Meds   1  Allopurinol 100 MG Oral Tablet; Take 1 tablet by mouth two  times daily; Therapy: 65CBF8374 to (Evaluate:10Wqj1264)  Requested for: 76HOP3228; Last   Rx:12Jan2015 Ordered   2   Atorvastatin Calcium 10 MG Oral Tablet; TAKE 1 TABLET DAILY; Therapy: 60TQO2262 to (Evaluate:59Gjw1879)  Requested for: 22UNN2990; Last   Rx:60Dic8342 Ordered   3  Budesonide 0 5 MG/2ML Inhalation Suspension; USE 1 UNIT DOSE VIA NEBULIZER   DAILY; Therapy: 33LFR2154 to (Evaluate:34Cix0176)  Requested for: 22EJW1517; Last   Rx:32Mdn2506 Ordered   4  Carvedilol 25 MG Oral Tablet; 1po bid; Therapy: 22ZQX5014 to (Evaluate:37Vzx6883)  Requested for: 67LMJ0830; Last   Rx:36Otk1147 Ordered   5  Cephalexin 500 MG Oral Capsule; TAKE 1 CAPSULE 3 TIMES DAILY UNTIL GONE;   Therapy: 36SNR6916 to (Evaluate:18Jan2016); Last ZW:45CMW7359 Ordered   6  Citalopram Hydrobromide 10 MG Oral Tablet; take 1 tablet by mouth daily; Therapy: 83EIK2086 to (Evaluate:04Utg5261)  Requested for: 45UQQ2845; Last   Rx:06Jan2016 Ordered   7  Diltiazem HCl - 120 MG Oral Tablet; Take 1 tablet twice daily; Therapy: 67FJD1391 to (Fatimah Hernandez)  Requested for: 80UCV7916; Last   Rx:26Jun2015 Ordered   8  Doxercalciferol 0 5 MCG Oral Capsule; TAKE 1 CAPSULE Daily  Requested for:   81Wmp4795; Last Rx:44Dsv8397 Ordered   9  Doxycycline Hyclate 100 MG Oral Capsule; TAKE 1 CAPSULE TWICE DAILY UNTIL   GONE;   Therapy: 57NFK8038 to (Evaluate:59Ssx4322)  Requested for: 24QUQ6296; Last   Rx:71Uib7231 Ordered   10  Doxycycline Hyclate 100 MG Oral Capsule; TAKE 1 CAPSULE TWICE DAILY UNTIL    GONE;    Therapy: 60USG0966 to (Evaluate:07Jan2016)  Requested for: 92Gux0015; Last    Rx:21Tbc5751 Ordered   11  Furosemide 40 MG Oral Tablet; TAKE 1 TABLET TWICE DAILY; Therapy: 76AVE4108 to (Evaluate:20Jun2016)  Requested for: 37SUP6254; Last    Rx:26Jun2015 Ordered   12  Ipratropium-Albuterol 0 5-2 5 (3) MG/3ML Inhalation Solution; 1 unit dose qid prn; Therapy: 24CBM0417 to (Evaluate:74Nmp6854)  Requested for: 66BUE4883; Last    Rx:37Zim9469 Ordered   13  Levofloxacin 500 MG Oral Tablet; TAKE 1 TABLET DAILY; Therapy: 02YMA4670 to (Evaluate:22Qpg2114)  Requested for: 57WZY6387; Last    Rx:76Vzt7757 Ordered   14  Levothyroxine Sodium 88 MCG Oral Tablet; Take 1 tablet daily + extra 1/2 tablelet (44    mcg) once weekly; Therapy: 27TPA6509 to (Evaluate:28Oct2016)  Requested for: 41BWQ8416; Last    Rx:03Nov2015 Ordered   15  Lisinopril 2 5 MG Oral Tablet; take 1 tablet by mouth daily; Therapy: 76SDC1405 to (Evaluate:99Auh2356)  Requested for: 33HSE3660; Last    Rx:86Vip4680 Ordered   16  MetroNIDAZOLE 500 MG Oral Tablet; Take 1 three times daily for 10 days; Therapy: 91Nmp6550 to (Evaluate:30Ftr9820); Last Rx:63Llr6977 Ordered   17  Mupirocin Calcium 2 % External Cream; APPLY AND GENTLY MASSAGE INTO    AFFECTED AREA(S) TWICE DAILY; Therapy: 50BAO5078 to (Evaluate:18Jan2016); Last QW:76YAG9079 Ordered   18  Mupirocin Calcium 2 % External Cream; APPLY AND GENTLY MASSAGE INTO    AFFECTED AREA(S) TWICE DAILY; Therapy: 49Iir2482 to (Terrell Luu)  Requested for: 29Apr2015; Last    Rx:81Otr6148 Ordered   19  Ondansetron HCl - 4 MG Oral Tablet; 1 PO BID PRN NAUSEA; Therapy: 00QDG8521 to (Sola Paul)  Requested for: 54KAJ8224; Last    Rx:13Rpv6495 Ordered   20  PredniSONE 10 MG Oral Tablet; 4/d for  3days, 3/d for 3 days, 2/d for 3 days, 1/d for 3    days; Therapy: 08SGQ2089 to (Evaluate:09Jan2016)  Requested for: 0676 543 19 15; Last    Rx:37Ytg2227 Ordered   21  PredniSONE 20 MG Oral Tablet; 1 PO BID; Therapy: 29BJY8467 to (Evaluate:08Vow4447)  Requested for: 37Agb4579; Last    Rx:40Gss0231 Ordered   22  Promethazine-Codeine 6 25-10 MG/5ML Oral Syrup; take 1 teaspoonful every 6 hours as    needed; Therapy: 36Ovv5856 to (Evaluate:20Quc9628)  Requested for: 74QNE5883; Last    Rx:08Ybi4081 Ordered   23  TraMADol HCl - 50 MG Oral Tablet; 1/2-1 po tid prn pain; Therapy: 07THS2504 to (Evaluate:03Mar2015); Last Rx:02Jan2015 Ordered   24  ValACYclovir HCl - 500 MG Oral Tablet; 1 po tid; Therapy: 79IOE3546 to (Evaluate:18Feb2016); Last Rx:28Jan2016 Ordered   25   Warfarin Sodium 5 MG Oral Tablet; take 1 tablet by mouth  daily as directed; Therapy: 78Vtf9687 to (Evaluate:39Tfc4440)  Requested for: 97Nen8476; Last    Rx:72Xjt9537 Ordered  Medication List Reviewed: The medication list was reviewed and updated today  Allergies    1  morphine    Vitals  Signs [Data Includes: Current Encounter]   Recorded: 21Wpj2939 06:41PM   Heart Rate: 76  Systolic: 734  Diastolic: 80  Height Unobtainable: Yes  Weight Unobtainable: Yes    Physical Exam    Constitutional   General appearance: Abnormal   chronically ill, uncomfortable and within normal limits of ideal weight  Pulmonary   Auscultation of lungs: Clear to auscultation, equal breath sounds bilaterally, no wheezes, no rales, no rhonci  Cardiovascular   Auscultation of heart: Abnormal   The rhythm was irregularly irregular  Examination of extremities for edema and/or varicosities: Abnormal   bilateral ankle 1+ pitting edema and bilateral pretibial 1+ pitting edema  Lymphatic   Palpation of lymph nodes in neck: No lymphadenopathy  Skin   Examination of the skin for lesions: Abnormal   a single ulcer, Location:  on the right shin        Signatures   Electronically signed by :  Anita Fowler MD; Feb 21 2016  6:46PM EST                       (Author)

## 2018-01-15 NOTE — RESULT NOTES
Verified Results  Coumadin Flow Sheet [id number] 08:40AM Janna Ramirez     Test Name Result Flag Reference   INR 1 8

## 2018-01-15 NOTE — RESULT NOTES
Verified Results  Coumadin Flow Sheet [id number] 09:42AM Debrah Ahumada     Test Name Result Flag Reference   INR 2 0     Current Dose ALT 7 5/5

## 2018-01-15 NOTE — RESULT NOTES
Verified Results  Coumadin Flow Sheet [id number] 08:07AM Heron Coates     Test Name Result Flag Reference   INR 1 3     Current Dose ALT 7 5MG/5MG

## 2018-01-15 NOTE — RESULT NOTES
Verified Results  Coumadin Flow Sheet [id number] 08:47AM Ciro Reddy     Test Name Result Flag Reference   INR 2 3     Current Dose alt 2 5/5mg

## 2018-01-15 NOTE — RESULT NOTES
Verified Results  Coumadin Flow Sheet [id number] 09:04AM Rafa Israel     Test Name Result Flag Reference   INR 2 0     Current Dose ALT 7 5MG/5MG

## 2018-01-15 NOTE — RESULT NOTES
Verified Results  Coumadin Flow Sheet [id number] 08:18AM Mark Erazo     Test Name Result Flag Reference   INR 3 1     Current Dose 2 5/5MG

## 2018-01-15 NOTE — RESULT NOTES
Verified Results  Coumadin Flow Sheet [id number] 09:16AM Keokuk Bolaños     Test Name Result Flag Reference   INR 2 0     Current Dose alt 2 5/5mg qd

## 2018-01-16 NOTE — RESULT NOTES
Verified Results  Coumadin Flow Sheet [id number] 08:33AM Stevens Sample     Test Name Result Flag Reference   INR 2 9     Current Dose      HOLD FOR 2 DAYS RESTART 6MG QD RECHECK TUES   Comments      PT RECHECKED A DAY EARLY

## 2018-01-16 NOTE — RESULT NOTES
Verified Results  Coumadin Flow Sheet [id number] 02:52PM João Zuniga     Test Name Result Flag Reference   INR 2 9

## 2018-01-16 NOTE — RESULT NOTES
Verified Results  Coumadin Flow Sheet [id number] 11:31AM Riley Reddy     Test Name Result Flag Reference   INR 1 5     Current Dose ALT 7 5MG/5MG

## 2018-01-16 NOTE — RESULT NOTES
Verified Results  Coumadin Flow Sheet [id number] 08:48AM Andrew Rendon     Test Name Result Flag Reference   INR 1 1     Current Dose 7 5 mg qd

## 2018-01-16 NOTE — RESULT NOTES
Verified Results  Coumadin Flow Sheet [id number] 07:55AM Rafa Israel     Test Name Result Flag Reference   INR 1 2     Current Dose ALT 2 5/5MG QD

## 2018-01-16 NOTE — RESULT NOTES
Verified Results  Coumadin Flow Sheet [id number] 09:01AM Debrah Ahumada     Test Name Result Flag Reference   INR 2 4

## 2018-01-17 NOTE — RESULT NOTES
Verified Results  Coumadin Flow Sheet [id number] 10:00AM Telma Gerardo     Test Name Result Flag Reference   INR 4 4     Current Dose 4 5 mg qd

## 2018-01-17 NOTE — RESULT NOTES
Verified Results  Coumadin Flow Sheet [id number] 08:18AM Johanny Gonzalez     Test Name Result Flag Reference   INR 1 2         Plan  Hypertension    · From  Levothyroxine Sodium 88 MCG Oral Tablet Take 1 tablet daily + extra  1/2 tablelet (44 mcg) once weekly To Levothyroxine Sodium 100 MCG Oral Tablet TAKE  1 TABLET DAILY AS DIRECTED  PMH: Acute maxillary sinusitis, recurrence not specified    · LevoFLOXacin 500 MG Oral Tablet (Levaquin)  PMH: History of acute bronchitis    · LevoFLOXacin 500 MG Oral Tablet (Levaquin)

## 2018-01-17 NOTE — RESULT NOTES
Verified Results  Coumadin Flow Sheet [id number] 08:54AM Austen Marrero     Test Name Result Flag Reference   INR 2 9

## 2018-01-17 NOTE — RESULT NOTES
Verified Results  Coumadin Flow Sheet [id number] 01:48PM Alessandro De Leon     Test Name Result Flag Reference   INR 1 2     Current Dose 6MG QD

## 2018-01-17 NOTE — RESULT NOTES
Verified Results  Coumadin Flow Sheet [id number] 08:42AM [place] December     Test Name Result Flag Reference   Recheck INR 2 0     Current Dose 5MG QD

## 2018-01-17 NOTE — RESULT NOTES
Verified Results  Coumadin Flow Sheet [id number] 09:02AM Gillian Fenton     Test Name Result Flag Reference   INR 2 8

## 2018-01-17 NOTE — RESULT NOTES
Verified Results  Coumadin Flow Sheet [id number] 09:42AM Marlee Loya     Test Name Result Flag Reference   INR 1 1

## 2018-01-17 NOTE — RESULT NOTES
Verified Results  Coumadin Flow Sheet [id number] 09:12AM Myers Hunger     Test Name Result Flag Reference   INR 3 2

## 2018-01-17 NOTE — RESULT NOTES
Verified Results  Coumadin Flow Sheet [id number] 09:08AM Trina Gut     Test Name Result Flag Reference   Diagnosis I48 91     INR 2 1     Current Dose ALT 2 5/5MG QD

## 2018-01-17 NOTE — RESULT NOTES
Verified Results  Coumadin Flow Sheet [id number] 09:23AM Ulices Khanna     Test Name Result Flag Reference   INR 3 0     Current Dose ALT 7 5/5MG

## 2018-01-17 NOTE — RESULT NOTES
Verified Results  Coumadin Flow Sheet [id number] 08:35AM Jose Vaughan     Test Name Result Flag Reference   INR 1 1     Current Dose 7 5 MG QD

## 2018-01-18 NOTE — RESULT NOTES
Verified Results  Coumadin Flow Sheet [id number] 07:54AM Roxianne Floor     Test Name Result Flag Reference   INR 2 3     Current Dose ALT 2 5/5MG QD

## 2018-01-18 NOTE — RESULT NOTES
Verified Results  Coumadin Flow Sheet [id number] 08:29AM Chelo Merritt     Test Name Result Flag Reference   INR 2 6     Current Dose alt 2 5/5mg

## 2018-01-18 NOTE — RESULT NOTES
Verified Results  Coumadin Flow Sheet [id number] 08:25AM Chelo Merritt     Test Name Result Flag Reference   INR 1 6     Current Dose ALT 5MG/2 5

## 2018-01-18 NOTE — RESULT NOTES
Verified Results  Coumadin Flow Sheet [id number] 09:33AM Marlee Loya     Test Name Result Flag Reference   INR 2 1